# Patient Record
Sex: FEMALE | Race: ASIAN | NOT HISPANIC OR LATINO | URBAN - METROPOLITAN AREA
[De-identification: names, ages, dates, MRNs, and addresses within clinical notes are randomized per-mention and may not be internally consistent; named-entity substitution may affect disease eponyms.]

---

## 2018-07-02 ENCOUNTER — EMERGENCY (EMERGENCY)
Facility: HOSPITAL | Age: 38
LOS: 1 days | Discharge: HOME | End: 2018-07-02
Attending: EMERGENCY MEDICINE
Payer: COMMERCIAL

## 2018-07-02 VITALS — WEIGHT: 125 LBS | HEIGHT: 59 IN

## 2018-07-02 VITALS
OXYGEN SATURATION: 99 % | TEMPERATURE: 99 F | SYSTOLIC BLOOD PRESSURE: 115 MMHG | HEART RATE: 81 BPM | DIASTOLIC BLOOD PRESSURE: 73 MMHG | RESPIRATION RATE: 18 BRPM

## 2018-07-02 LAB
ALBUMIN SERPL ELPH-MCNC: 4.2 G/DL — SIGNIFICANT CHANGE UP (ref 3.3–5.2)
ALP SERPL-CCNC: 46 U/L — SIGNIFICANT CHANGE UP (ref 40–120)
ALT FLD-CCNC: 10 U/L — SIGNIFICANT CHANGE UP
ANION GAP SERPL CALC-SCNC: 15 MMOL/L — SIGNIFICANT CHANGE UP (ref 5–17)
AST SERPL-CCNC: 17 U/L — SIGNIFICANT CHANGE UP
BASOPHILS # BLD AUTO: 0 K/UL — SIGNIFICANT CHANGE UP (ref 0–0.2)
BASOPHILS NFR BLD AUTO: 0.6 % — SIGNIFICANT CHANGE UP (ref 0–2)
BILIRUB SERPL-MCNC: <0.2 MG/DL — LOW (ref 0.4–2)
BUN SERPL-MCNC: 14 MG/DL — SIGNIFICANT CHANGE UP (ref 8–20)
CALCIUM SERPL-MCNC: 9.3 MG/DL — SIGNIFICANT CHANGE UP (ref 8.6–10.2)
CHLORIDE SERPL-SCNC: 100 MMOL/L — SIGNIFICANT CHANGE UP (ref 98–107)
CO2 SERPL-SCNC: 25 MMOL/L — SIGNIFICANT CHANGE UP (ref 22–29)
CREAT SERPL-MCNC: 0.68 MG/DL — SIGNIFICANT CHANGE UP (ref 0.5–1.3)
EOSINOPHIL # BLD AUTO: 0.1 K/UL — SIGNIFICANT CHANGE UP (ref 0–0.5)
EOSINOPHIL NFR BLD AUTO: 1.9 % — SIGNIFICANT CHANGE UP (ref 0–6)
GLUCOSE SERPL-MCNC: 113 MG/DL — SIGNIFICANT CHANGE UP (ref 70–115)
HCT VFR BLD CALC: 33.4 % — LOW (ref 37–47)
HGB BLD-MCNC: 10.5 G/DL — LOW (ref 12–16)
HIV 1 & 2 AB SERPL IA.RAPID: SIGNIFICANT CHANGE UP
LYMPHOCYTES # BLD AUTO: 2.4 K/UL — SIGNIFICANT CHANGE UP (ref 1–4.8)
LYMPHOCYTES # BLD AUTO: 39.5 % — SIGNIFICANT CHANGE UP (ref 20–55)
MCHC RBC-ENTMCNC: 26.9 PG — LOW (ref 27–31)
MCHC RBC-ENTMCNC: 31.4 G/DL — LOW (ref 32–36)
MCV RBC AUTO: 85.6 FL — SIGNIFICANT CHANGE UP (ref 81–99)
MONOCYTES # BLD AUTO: 0.4 K/UL — SIGNIFICANT CHANGE UP (ref 0–0.8)
MONOCYTES NFR BLD AUTO: 5.8 % — SIGNIFICANT CHANGE UP (ref 3–10)
NEUTROPHILS # BLD AUTO: 3.2 K/UL — SIGNIFICANT CHANGE UP (ref 1.8–8)
NEUTROPHILS NFR BLD AUTO: 52 % — SIGNIFICANT CHANGE UP (ref 37–73)
PLATELET # BLD AUTO: 296 K/UL — SIGNIFICANT CHANGE UP (ref 150–400)
POTASSIUM SERPL-MCNC: 3.9 MMOL/L — SIGNIFICANT CHANGE UP (ref 3.5–5.3)
POTASSIUM SERPL-SCNC: 3.9 MMOL/L — SIGNIFICANT CHANGE UP (ref 3.5–5.3)
PROT SERPL-MCNC: 7.2 G/DL — SIGNIFICANT CHANGE UP (ref 6.6–8.7)
RBC # BLD: 3.9 M/UL — LOW (ref 4.4–5.2)
RBC # FLD: 15.3 % — SIGNIFICANT CHANGE UP (ref 11–15.6)
SODIUM SERPL-SCNC: 140 MMOL/L — SIGNIFICANT CHANGE UP (ref 135–145)
WBC # BLD: 6.2 K/UL — SIGNIFICANT CHANGE UP (ref 4.8–10.8)
WBC # FLD AUTO: 6.2 K/UL — SIGNIFICANT CHANGE UP (ref 4.8–10.8)

## 2018-07-02 PROCEDURE — 80074 ACUTE HEPATITIS PANEL: CPT

## 2018-07-02 PROCEDURE — 86703 HIV-1/HIV-2 1 RESULT ANTBDY: CPT

## 2018-07-02 PROCEDURE — 36415 COLL VENOUS BLD VENIPUNCTURE: CPT

## 2018-07-02 PROCEDURE — 80053 COMPREHEN METABOLIC PANEL: CPT

## 2018-07-02 PROCEDURE — 99283 EMERGENCY DEPT VISIT LOW MDM: CPT

## 2018-07-02 PROCEDURE — 85027 COMPLETE CBC AUTOMATED: CPT

## 2018-07-02 PROCEDURE — 99284 EMERGENCY DEPT VISIT MOD MDM: CPT

## 2018-07-02 RX ORDER — EMTRICITABINE AND TENOFOVIR DISOPROXIL FUMARATE 200; 300 MG/1; MG/1
1 TABLET, FILM COATED ORAL DAILY
Qty: 0 | Refills: 0 | Status: DISCONTINUED | OUTPATIENT
Start: 2018-07-02 | End: 2018-07-07

## 2018-07-02 RX ORDER — RALTEGRAVIR 400 MG/1
400 TABLET, FILM COATED ORAL ONCE
Qty: 0 | Refills: 0 | Status: COMPLETED | OUTPATIENT
Start: 2018-07-02 | End: 2018-07-02

## 2018-07-02 RX ADMIN — RALTEGRAVIR 400 MILLIGRAM(S): 400 TABLET, FILM COATED ORAL at 18:21

## 2018-07-02 RX ADMIN — EMTRICITABINE AND TENOFOVIR DISOPROXIL FUMARATE 1 TABLET(S): 200; 300 TABLET, FILM COATED ORAL at 18:21

## 2018-07-02 NOTE — ED STATDOCS - SKIN, MLM
lateral aspect left thumb positive inoculation point noted with no active bleeding, normal ROM, normal sensation

## 2018-07-02 NOTE — ED STATDOCS - OBJECTIVE STATEMENT
36 y/o F pt presents to ED c/o finger stick at work. 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 She was bleeding and washed it and was told by supervisor to come to ED for further evaluation. Denies any current pain, bleeding or numbness. Denies significant PMHx or SHx. Tetanus UTD. Denies knowledge of last HIV testing.

## 2018-07-02 NOTE — ED ADULT NURSE NOTE - OBJECTIVE STATEMENT
pt reports a needle stick today at 1:45pm while at work, a needle from a HD patient stuck her left thumb. +bleeding, was cleaned with soap and water, milked the finger.

## 2018-07-02 NOTE — ED STATDOCS - MEDICAL DECISION MAKING DETAILS
38 y/o F pt presents to ED for finger stick. Pt treated with prophylaxis in ED and prescription for 1 week sent to pharmacy. Pt D/C in stable condition

## 2018-07-02 NOTE — ED STATDOCS - PROGRESS NOTE DETAILS
Discussion about HIV exposure and blood borne pathogen exposure. Pt offered post exposure prophylaxis and accepts prophylaxis. Pt instructed to f/u with PMD after ED visit. Labs drawn in ED, CBC, CMP, HIV 1 and 2, and Hep panel. Pt will be given rapid HIV results and will be notified by phone when results available. Pt treated with prophylaxis in ED and prescription for 1 week sent to pharmacy. Pt D/C in stable condition Pt HIV negative and result discussed with patient. Finger cleaned with normal saline and Betadine. Pt tolerated well. Pt spoken to about Needle safety and Counseled on HIV testing for accidental needle stick. HIV PEP medications reviewed with patient.  PEP side effects and standard protocol reviewed. Pt refused/accepts PEP medication. Pt has been informed to F/U with PCP 3 days. Pt also informed the he also needs a repeat HIV in 3months as per protocol . Pt states understanding. CBC, CMP , Hepatitis panel and Rapid HIV1/2. Three day supply PEP provided to patient in ED.

## 2018-07-02 NOTE — ED STATDOCS - ATTENDING CONTRIBUTION TO CARE
After interviewing the patient, I agree with the HPI as discussed . My personal exam reveals findings consistent with those discussed. Available diagnostic studies were reviewed. I confirm the diagnosis as discussed with the ACP. The care plan articulated is consistent with our discussion of the patient's particulars. In brief, Hx [ Needlestick exposure],Exam [Puncture wound to the left thumb ], Plan[Discussed Postexposure prophylaxis With patients Who accepted a three day supply And will follow up with PMD For further Medication. Bloods were drawn And the wound was cleaned ]

## 2018-07-02 NOTE — ED STATDOCS - CARE PLAN
Principal Discharge DX:	Exposure to needle, initial encounter  Assessment and plan of treatment:	F/U with PCP in 3 days as discussed . Repeat HIV x 3months  Continue with medication

## 2018-07-03 LAB
HAV IGM SER-ACNC: SIGNIFICANT CHANGE UP
HBV CORE IGM SER-ACNC: SIGNIFICANT CHANGE UP
HBV SURFACE AG SER-ACNC: SIGNIFICANT CHANGE UP
HCV AB S/CO SERPL IA: 0.18 S/CO — SIGNIFICANT CHANGE UP
HCV AB SERPL-IMP: SIGNIFICANT CHANGE UP

## 2018-07-03 RX ORDER — EMTRICITABINE AND TENOFOVIR DISOPROXIL FUMARATE 200; 300 MG/1; MG/1
1 TABLET, FILM COATED ORAL
Qty: 7 | Refills: 0 | OUTPATIENT
Start: 2018-07-03 | End: 2018-07-09

## 2018-07-03 RX ORDER — RALTEGRAVIR 400 MG/1
1 TABLET, FILM COATED ORAL
Qty: 14 | Refills: 0 | OUTPATIENT
Start: 2018-07-03 | End: 2018-07-09

## 2018-07-06 RX ORDER — TENOFOVIR DISOPROXIL FUMARATE 300 MG/1
1 TABLET, FILM COATED ORAL
Qty: 28 | Refills: 0 | OUTPATIENT
Start: 2018-07-06 | End: 2018-08-02

## 2018-07-06 RX ORDER — EMTRICITABINE 200 MG/1
1 CAPSULE ORAL
Qty: 28 | Refills: 0 | OUTPATIENT
Start: 2018-07-06 | End: 2018-08-02

## 2019-10-01 NOTE — ED STATDOCS - TEMPLATE, MLM
Nerve Block  Date/Time: 10/1/2019 7:11 AM  Performed by: Beatriz Rojo DO  Authorized by: Beatriz Rojo DO     Block Type:  Upper Extremity  Upper Extremity:  Interscalene  Patient Location:  Pre-op  Indication: post-op pain management    patient identified, IV checked, risks and benefits discussed, surgical consent, monitors and equipment checked, pre-op evaluation and timeout performed    Patient Position:  Left lateral decubitus  Prep:  Chlorhexidine gluconate (CHG)  Max Sterile Barrier Technique:  Hand Washing, Cap/Mask and Sterile gloves  Monitoring:  Continuous pulse oximetry, blood pressure and EKG  Laterality:  Right  Injection Technique:  Single-shot  Procedures: ultrasound guided and Doppler guided    Local Infiltration:  Lidocaine  Strength:  1  Dose:  2  Needle Type:  Stimulating  Needle Gauge:  22 G  Needle Length:  5 cm  Needle Localization:  Anatomical landmarks and ultrasound guidance  Physical status during block:  Awake  Injection Assessment:  Negative aspiration for heme, no resistance to injection and no paresthesia on injection  Patient Condition:  Tolerated well, no immediate complications  Paresthesia Pain:  None  Heart Rate Change: No    Slowly Injected: Yes    Performed By:  Anesthesiologist  Anesthesiologist:  Beatriz Rojo DO  Start Time:  10/1/2019 7:08 AM   Patient identified using two separate modes of identification. Patient chart reviewed. Laterality confirmed by asking patient and reviewing surgical consent form. The risks of interscalene block including ipsilateral arm weakness, ipsilateral Chayo's Syndrome, shortness of breath, nerve injury, bleeding, infection, and local anesthetic toxicity discussed with patient who agrees to proceed.     Semi recumbent position  Chlorhexidine skin preparation   Ultrasound guidance, in plane technique  Good anatomical ID of the brachial plexus  1% lidocaine skin weal raised  22-gauge, 5 cm stimplex inserted in plane  15 mL of 0.5% ropiv Abdominal pain injected slowly and incrementally around nerve plexus  No paresthesias.  (+) doughnut sign. No CNS or CVS Sx. No heme. 2 images obtained and permanently stored. Patient vital signs monitored continuously during procedure and immediately post procedure. Patient tolerated procedure well. No apparent complications. Ipsilateral arm weakness after 10 minutes. Procedure done at surgeon's request for post-op pain control. General